# Patient Record
Sex: MALE | Race: WHITE | NOT HISPANIC OR LATINO | Employment: FULL TIME | URBAN - METROPOLITAN AREA
[De-identification: names, ages, dates, MRNs, and addresses within clinical notes are randomized per-mention and may not be internally consistent; named-entity substitution may affect disease eponyms.]

---

## 2023-08-20 ENCOUNTER — HOSPITAL ENCOUNTER (EMERGENCY)
Facility: HOSPITAL | Age: 18
Discharge: HOME | End: 2023-08-20
Attending: EMERGENCY MEDICINE
Payer: COMMERCIAL

## 2023-08-20 VITALS
TEMPERATURE: 98 F | WEIGHT: 186 LBS | HEIGHT: 63 IN | OXYGEN SATURATION: 99 % | HEART RATE: 77 BPM | DIASTOLIC BLOOD PRESSURE: 76 MMHG | SYSTOLIC BLOOD PRESSURE: 126 MMHG | RESPIRATION RATE: 19 BRPM | BODY MASS INDEX: 32.96 KG/M2

## 2023-08-20 DIAGNOSIS — R55 SYNCOPE: ICD-10-CM

## 2023-08-20 DIAGNOSIS — S59.911A RIGHT FOREARM INJURY, INITIAL ENCOUNTER: ICD-10-CM

## 2023-08-20 LAB
ALBUMIN SERPL BCP-MCNC: 4.7 G/DL (ref 3.5–5.2)
ALP SERPL-CCNC: 59 U/L (ref 38–126)
ALT SERPL W/O P-5'-P-CCNC: 38 U/L (ref 10–44)
ANION GAP SERPL CALC-SCNC: 12 MMOL/L (ref 8–16)
AST SERPL-CCNC: 28 U/L (ref 15–46)
BASOPHILS # BLD AUTO: 0.07 K/UL (ref 0–0.2)
BASOPHILS NFR BLD: 0.7 % (ref 0–1.9)
BILIRUB SERPL-MCNC: 0.6 MG/DL (ref 0.1–1)
CALCIUM SERPL-MCNC: 9.4 MG/DL (ref 8.7–10.5)
CHLORIDE SERPL-SCNC: 107 MMOL/L (ref 95–110)
CO2 SERPL-SCNC: 24 MMOL/L (ref 23–29)
CREAT SERPL-MCNC: 1.02 MG/DL (ref 0.5–1.4)
DIFFERENTIAL METHOD: NORMAL
EOSINOPHIL # BLD AUTO: 0.3 K/UL (ref 0–0.5)
EOSINOPHIL NFR BLD: 3.2 % (ref 0–8)
ERYTHROCYTE [DISTWIDTH] IN BLOOD BY AUTOMATED COUNT: 12.7 % (ref 11.5–14.5)
EST. GFR  (NO RACE VARIABLE): >60 ML/MIN/1.73 M^2
ETHANOL SERPL-MCNC: <10 MG/DL
GLUCOSE SERPL-MCNC: 99 MG/DL (ref 70–110)
HCT VFR BLD AUTO: 42.5 % (ref 40–54)
HGB BLD-MCNC: 14.4 G/DL (ref 14–18)
IMM GRANULOCYTES # BLD AUTO: 0.03 K/UL (ref 0–0.04)
IMM GRANULOCYTES NFR BLD AUTO: 0.3 % (ref 0–0.5)
LYMPHOCYTES # BLD AUTO: 2.2 K/UL (ref 1–4.8)
LYMPHOCYTES NFR BLD: 20.5 % (ref 18–48)
MCH RBC QN AUTO: 28.3 PG (ref 27–31)
MCHC RBC AUTO-ENTMCNC: 33.9 G/DL (ref 32–36)
MCV RBC AUTO: 84 FL (ref 82–98)
MONOCYTES # BLD AUTO: 0.8 K/UL (ref 0.3–1)
MONOCYTES NFR BLD: 7.7 % (ref 4–15)
NEUTROPHILS # BLD AUTO: 7.2 K/UL (ref 1.8–7.7)
NEUTROPHILS NFR BLD: 67.6 % (ref 38–73)
NRBC BLD-RTO: 0 /100 WBC
PLATELET # BLD AUTO: 304 K/UL (ref 150–450)
PMV BLD AUTO: 10.7 FL (ref 9.2–12.9)
POTASSIUM SERPL-SCNC: 3.9 MMOL/L (ref 3.5–5.1)
PROT SERPL-MCNC: 8.1 G/DL (ref 6–8.4)
RBC # BLD AUTO: 5.09 M/UL (ref 4.6–6.2)
SODIUM SERPL-SCNC: 143 MMOL/L (ref 136–145)
TROPONIN I SERPL-MCNC: <0.012 NG/ML (ref 0.01–0.03)
UUN UR-MCNC: 12 MG/DL (ref 2–20)
WBC # BLD AUTO: 10.59 K/UL (ref 3.9–12.7)

## 2023-08-20 PROCEDURE — 94760 N-INVAS EAR/PLS OXIMETRY 1: CPT | Mod: ER

## 2023-08-20 PROCEDURE — 93005 ELECTROCARDIOGRAM TRACING: CPT | Mod: ER

## 2023-08-20 PROCEDURE — 93010 EKG 12-LEAD: ICD-10-PCS | Mod: ,,, | Performed by: INTERNAL MEDICINE

## 2023-08-20 PROCEDURE — 80053 COMPREHEN METABOLIC PANEL: CPT | Mod: ER | Performed by: EMERGENCY MEDICINE

## 2023-08-20 PROCEDURE — 80307 DRUG TEST PRSMV CHEM ANLYZR: CPT | Mod: ER | Performed by: EMERGENCY MEDICINE

## 2023-08-20 PROCEDURE — 84484 ASSAY OF TROPONIN QUANT: CPT | Mod: ER | Performed by: EMERGENCY MEDICINE

## 2023-08-20 PROCEDURE — 82077 ASSAY SPEC XCP UR&BREATH IA: CPT | Mod: ER | Performed by: EMERGENCY MEDICINE

## 2023-08-20 PROCEDURE — 93010 ELECTROCARDIOGRAM REPORT: CPT | Mod: ,,, | Performed by: INTERNAL MEDICINE

## 2023-08-20 PROCEDURE — 99285 EMERGENCY DEPT VISIT HI MDM: CPT | Mod: 25,ER

## 2023-08-20 PROCEDURE — 85025 COMPLETE CBC W/AUTO DIFF WBC: CPT | Mod: ER | Performed by: EMERGENCY MEDICINE

## 2023-08-20 NOTE — LETTER
"Arya Crowellcherrie" Taty was seen and treated in our emergency department on 8/20/2023.  He may return to work on 08/23/2023.       If you have any questions or concerns, please don't hesitate to call.      PREMA Horowitz RN    "

## 2023-08-21 LAB
AMPHET+METHAMPHET UR QL: NEGATIVE
BARBITURATES UR QL SCN>200 NG/ML: NEGATIVE
BENZODIAZ UR QL SCN>200 NG/ML: NEGATIVE
BZE UR QL SCN: NEGATIVE
CANNABINOIDS UR QL SCN: NEGATIVE
CREAT UR-MCNC: 205.5 MG/DL (ref 23–375)
METHADONE UR QL SCN>300 NG/ML: NEGATIVE
OPIATES UR QL SCN: NEGATIVE
PCP UR QL SCN>25 NG/ML: NEGATIVE
TOXICOLOGY INFORMATION: NORMAL
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
## 2023-08-31 ENCOUNTER — TELEPHONE (OUTPATIENT)
Dept: NEUROLOGY | Facility: CLINIC | Age: 18
End: 2023-08-31
Payer: COMMERCIAL

## 2023-08-31 NOTE — TELEPHONE ENCOUNTER
----- Message from Cindy Frank sent at 8/31/2023 11:54 AM CDT -----  Type: Appointment Request      Name of Caller: for savage  When is the first available appointment?10/9  Symptoms:R55 (ICD-10-CM) - Syncope  Best Call Back Number:[phone] or  erik falk   Additional Information: pt was told for 8/29 doctor had a meeting and will be schedule for 10/3 but doesn't see any upcoming appts, also would like to be seen sooner if possible because he can't return to work until he is seen by a neurologist and can't be without for that long period of time would like to speak with office

## 2023-08-31 NOTE — TELEPHONE ENCOUNTER
Called patient and explained there are no new patient slots in September and that 10/3/23 at 9:00 am is the soonest available. Told patient the appointment is on hold for him.

## 2023-09-11 ENCOUNTER — OFFICE VISIT (OUTPATIENT)
Dept: NEUROLOGY | Facility: CLINIC | Age: 18
End: 2023-09-11
Payer: COMMERCIAL

## 2023-09-11 VITALS
WEIGHT: 171.75 LBS | BODY MASS INDEX: 30.43 KG/M2 | SYSTOLIC BLOOD PRESSURE: 131 MMHG | HEIGHT: 63 IN | HEART RATE: 79 BPM | DIASTOLIC BLOOD PRESSURE: 86 MMHG

## 2023-09-11 DIAGNOSIS — R55 SYNCOPE AND COLLAPSE: Primary | ICD-10-CM

## 2023-09-11 PROCEDURE — 3075F SYST BP GE 130 - 139MM HG: CPT | Mod: CPTII,S$GLB,, | Performed by: PSYCHIATRY & NEUROLOGY

## 2023-09-11 PROCEDURE — 3008F PR BODY MASS INDEX (BMI) DOCUMENTED: ICD-10-PCS | Mod: CPTII,S$GLB,, | Performed by: PSYCHIATRY & NEUROLOGY

## 2023-09-11 PROCEDURE — 1160F PR REVIEW ALL MEDS BY PRESCRIBER/CLIN PHARMACIST DOCUMENTED: ICD-10-PCS | Mod: CPTII,S$GLB,, | Performed by: PSYCHIATRY & NEUROLOGY

## 2023-09-11 PROCEDURE — 3075F PR MOST RECENT SYSTOLIC BLOOD PRESS GE 130-139MM HG: ICD-10-PCS | Mod: CPTII,S$GLB,, | Performed by: PSYCHIATRY & NEUROLOGY

## 2023-09-11 PROCEDURE — 3079F PR MOST RECENT DIASTOLIC BLOOD PRESSURE 80-89 MM HG: ICD-10-PCS | Mod: CPTII,S$GLB,, | Performed by: PSYCHIATRY & NEUROLOGY

## 2023-09-11 PROCEDURE — 3008F BODY MASS INDEX DOCD: CPT | Mod: CPTII,S$GLB,, | Performed by: PSYCHIATRY & NEUROLOGY

## 2023-09-11 PROCEDURE — 99999 PR PBB SHADOW E&M-EST. PATIENT-LVL III: ICD-10-PCS | Mod: PBBFAC,,, | Performed by: PSYCHIATRY & NEUROLOGY

## 2023-09-11 PROCEDURE — 1160F RVW MEDS BY RX/DR IN RCRD: CPT | Mod: CPTII,S$GLB,, | Performed by: PSYCHIATRY & NEUROLOGY

## 2023-09-11 PROCEDURE — 1159F MED LIST DOCD IN RCRD: CPT | Mod: CPTII,S$GLB,, | Performed by: PSYCHIATRY & NEUROLOGY

## 2023-09-11 PROCEDURE — 99204 OFFICE O/P NEW MOD 45 MIN: CPT | Mod: S$GLB,,, | Performed by: PSYCHIATRY & NEUROLOGY

## 2023-09-11 PROCEDURE — 1159F PR MEDICATION LIST DOCUMENTED IN MEDICAL RECORD: ICD-10-PCS | Mod: CPTII,S$GLB,, | Performed by: PSYCHIATRY & NEUROLOGY

## 2023-09-11 PROCEDURE — 99204 PR OFFICE/OUTPT VISIT, NEW, LEVL IV, 45-59 MIN: ICD-10-PCS | Mod: S$GLB,,, | Performed by: PSYCHIATRY & NEUROLOGY

## 2023-09-11 PROCEDURE — 99999 PR PBB SHADOW E&M-EST. PATIENT-LVL III: CPT | Mod: PBBFAC,,, | Performed by: PSYCHIATRY & NEUROLOGY

## 2023-09-11 PROCEDURE — 3079F DIAST BP 80-89 MM HG: CPT | Mod: CPTII,S$GLB,, | Performed by: PSYCHIATRY & NEUROLOGY
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
## 2023-09-11 NOTE — PATIENT INSTRUCTIONS
You came to Epilepsy Clinic because of a single episode of loss of awareness with collapse. I do not know what happened during this event. Fainting from dehydration, a cardiac event, or a seizure could all cause a similar event.  I would like you to be seen by a cardiologist to make sure your heart is healthy. I would like you to continue to stay hydrated. Continue to use alcohol in moderation. I would like you to get an outpatient EEG to look at the electrical firing of your brain. Please call [phone] to schedule this.  Please sleep half your normal amount before the test so that you will fall asleep during the procedure which will provide us more information about how your brain works. Currently, after one event, according to guidelines, we do not start treatment. However, if there is a abnormality on the EEG or your have another event, we can discuss treatment at that time.     Per [place] law, no episodes of loss of consciousness for 6 months before driving.  Avoid dangerous situations.  For example, no baths/pools alone, no heights, no power tools.  Wear a bike helmet.  If another episode occurs, please patient portal me or call the office and we will decide the next steps. If multiple events occur in a row without return back to baseline, 911 needs to be called.     Return to clinic in 6 months or sooner with issues.  Please patient portal with any questions or concerns.    Destiney Mike MD PhD [facility]  Neurology-Epilepsy  [facility].    Forms/Letters/Disability/DMV Paperwork: We understand the importance of filling out forms and providing letters in a timely manner.  However, many of these forms have very tricky language and once an official form is submitted as part of the medical record, it can not be modified or erased.  Please work with us in order to get these forms filled out in the most complete, accurate, and efficient way. 1.  Once you are aware that a form will need to be  completed, please make an appointment.  A virtual appointment with Dr. Mike or Saba is perfectly fine. 2.  Please fill out the form as much as you can.  There are many questions that we do not have an answer for.  Please bring a blank copy of the form and your partially filled out form to the clinic visit or send them to us over the portal.  We will complete the form together with you during the clinic visit, sign it, and either return it to you or send it to the correct destination.  Every form will require an appointment however we can fill out multiple forms at once if needed.  Please do not hesitate to reach out with any questions or concerns about this policy.  We are trying to make sure that we have a system in place to meet this need which works for everyone involved.  Thank you for your understanding.

## 2023-10-25 ENCOUNTER — TELEPHONE (OUTPATIENT)
Dept: NEUROLOGY | Facility: CLINIC | Age: 18
End: 2023-10-25
Payer: COMMERCIAL

## 2023-10-25 NOTE — TELEPHONE ENCOUNTER
"Message forwarded to the disability office via e-mail.  Waiting on their response.    Destiney Mike MD PhD [facility]  Neurology-Epilepsy  [facility].      ----- Message from Mikayla Mukherjee sent at 10/25/2023 11:02 AM CDT -----  Regarding: pt advice  Contact: [phone] opt 2, opt 1 ext [phone]  Name Of Caller: Annette tom/[facility]     Contact Preference?: [phone] opt 2, opt 1 ext [phone]     What is the nature of the call?: calling to follow up on a medical request for pt's long term disability and would like to know what's the turn around for it     Additional Notes:    Fax: [phone]    "Thank you for all that you do for our patients"        "

## 2023-11-15 ENCOUNTER — TELEPHONE (OUTPATIENT)
Dept: NEUROLOGY | Facility: CLINIC | Age: 18
End: 2023-11-15
Payer: COMMERCIAL

## 2023-11-15 NOTE — TELEPHONE ENCOUNTER
Message forwarded via e-mail to the [facility] disability office.    Destiney Mike MD PhD [facility]  Neurology-Epilepsy  [facility].        ----- Message from Zofia Smalls sent at 11/15/2023  3:19 PM CST -----  Regarding: long term disability paperwork  Contact: Annette,  PATIENT CALL    Annette from [employer] called regarding LTD paperwork. States that she's trying to facilitate form completion so that they can process his claim. Please call back at [phone], option [phone], option 1. Ext [phone]

## 2023-12-21 ENCOUNTER — TELEPHONE (OUTPATIENT)
Dept: NEUROLOGY | Facility: CLINIC | Age: 18
End: 2023-12-21
Payer: COMMERCIAL

## 2023-12-21 NOTE — TELEPHONE ENCOUNTER
Message forwarded via e-mail to the disability desk.     Destiney Mike MD PhD [facility]  Neurology-Epilepsy  [facility].          ----- Message from Nu Youssef sent at 12/21/2023 11:18 AM CST -----  Regarding: Long term disability form  Contact: Sheryl @ [phone]  Sheryl calling with [employer] benefits  is calling to speak to someone in the office to discuss disability forms that were submitted to the office. Caller  states that they have not heard back from anyone in the office. Caller is asking for a return call back to advise. Thanks.     Please fax forms to : [phone]

## 2024-01-05 ENCOUNTER — TELEPHONE (OUTPATIENT)
Dept: NEUROLOGY | Facility: CLINIC | Age: 19
End: 2024-01-05

## 2024-01-05 NOTE — TELEPHONE ENCOUNTER
Disability paperwork located and filled out.  Sent to the fax listed.  Also sent to our disability office.    Destine[facility] Mike MD PhD [facility]  Neurology-Epilepsy  [facility].        ----- Message from Rhonda Vasquez sent at 1/5/2024  9:23 AM CST -----  Regarding: FW: F/U on paperwork  Contact: Guillaume @ [phone]  Any update on an paperwork for this patient?  ----- Message -----  From: Wali Gaitan  Sent: 1/2/2024  11:15 AM CST  To: Rashid Cabello Staff  Subject: F/U on paperwork                                 Guillaume with [employer] is calling to f/u on paperwork that was faxed on 12/21/2023 to [phone]. Guillaume stated that the paperwork is needed back as soon as possible. Please fax the paperwork back to [phone]. Please call Guillaume to discuss further     Reference number: [id number]

## 2024-01-05 NOTE — TELEPHONE ENCOUNTER
Disability paperwork located and filled out.  Sent to the fax listed.  Also sent to our disability office.    Destiney Mike MD PhD [facility]  Neurology-Epilepsy  [facility].      ----- Message from Lauren Kenyon PA-C sent at 1/5/2024  9:55 AM CST -----  Regarding: RE: F/U on paperwork  Contact: Guillaume @ [phone]  I do not recall ever seeing this paperwork, sorry. They may need to fax it again or send it via email.    Thanks,  Saba  ----- Message -----  From: Rhonda Vasquez  Sent: 1/5/2024   9:24 AM CST  To: Destiney Mike MD PhD; #  Subject: FW: F/U on paperwork                             Any update on an paperwork for this patient?  ----- Message -----  From: Wali Gaitan  Sent: 1/2/2024  11:15 AM CST  To: Rashid Cabello Staff  Subject: F/U on paperwork                                 Guillaume with [employer] is calling to f/u on paperwork that was faxed on 12/21/2023 to [phone]. uGillaume stated that the paperwork is needed back as soon as possible. Please fax the paperwork back to [phone]. Please call Guillaume to discuss further     Reference number: [id number]

## 2024-04-08 ENCOUNTER — TELEPHONE (OUTPATIENT)
Dept: NEUROLOGY | Facility: CLINIC | Age: 19
End: 2024-04-08

## 2024-04-08 NOTE — TELEPHONE ENCOUNTER
Called pt to schedule a follow up with Kostas to have some paperwork filled out. Pt agreed to come on 4/16 at 10 am.